# Patient Record
Sex: FEMALE | Race: WHITE
[De-identification: names, ages, dates, MRNs, and addresses within clinical notes are randomized per-mention and may not be internally consistent; named-entity substitution may affect disease eponyms.]

---

## 2017-07-11 ENCOUNTER — HOSPITAL ENCOUNTER (EMERGENCY)
Dept: HOSPITAL 26 - MED | Age: 57
Discharge: HOME | End: 2017-07-11
Payer: COMMERCIAL

## 2017-07-11 VITALS — SYSTOLIC BLOOD PRESSURE: 107 MMHG | DIASTOLIC BLOOD PRESSURE: 69 MMHG

## 2017-07-11 VITALS — DIASTOLIC BLOOD PRESSURE: 83 MMHG | SYSTOLIC BLOOD PRESSURE: 147 MMHG

## 2017-07-11 VITALS — HEIGHT: 65 IN | BODY MASS INDEX: 18.33 KG/M2 | WEIGHT: 110 LBS
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
## 2017-07-11 DIAGNOSIS — K21.9: ICD-10-CM

## 2017-07-11 DIAGNOSIS — Z90.89: ICD-10-CM
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
## 2017-07-11 DIAGNOSIS — F20.9: ICD-10-CM

## 2017-07-11 DIAGNOSIS — Z88.8: ICD-10-CM
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
## 2017-07-11 DIAGNOSIS — S01.81XA: Primary | ICD-10-CM

## 2017-07-11 NOTE — NUR
rechecked restraint;checked circulation;no skin discoloration/skin tear 
noted;safety measures done;all monitors in placed;will continue to monitor pt.

## 2017-07-11 NOTE — NUR
checked ule and lle;no compromise circulation noted;safety measures done;all 
monitors in placed;will continue to monitor pt,

## 2017-07-11 NOTE — NUR
PATIENT PRESENTS TO ED WITH brought in by ems;[facility] where pt 
resides, called 911;pt found with lac over left eyebrow, per pt slipped and 
fell in the bathroom;PT IN RESTRAINT;combative , ams;hx OF schizophrenia;AWAKE 
AND ALERT;LUNGS CLEAR BL; HR EVEN AND REGULAR; PT DENIES ANY FEVER, CP, SOB, OR 
COUGH AT THIS TIME; PATIENT STATES PAIN OF 0/10 AT THIS TIME; PATIENT 
POSITIONED FOR COMFORT; HOB ELEVATED; BEDRAILS UP X2; BED DOWN. ALL MONITORS IN 
PLACED;

## 2017-07-11 NOTE — NUR
Patient discharged with v/s stable. Written and verbal after care instructions 
given and explained. Patient alert, oriented and verbalized understanding of 
instructions. Ambulatory with steady gait. All questions addressed prior to 
discharge. ID band removed. Patient advised to follow up with PMD. Rx of NOTRIN 
given. Patient educated on indication of medication including possible reaction 
and side effects. Opportunity to ask questions provided and answered.

## 2017-07-11 NOTE — NUR
pt is restless/agiatted;keep on screaming;tried to calm pt;talked to pt;but pt 
still agitated;safety measures done;all monitors in placed;willcontinue to 
monitor pt/

## 2017-07-11 NOTE — NUR
TALKED TO PT;RESTRAINT WAS REMOVED;NO SKIN TEAR ;NO CIRCULATION COMPROMSED 
NOTED;SAFETY MEASUSRES DONE,

## 2018-12-07 ENCOUNTER — HOSPITAL ENCOUNTER (EMERGENCY)
Dept: HOSPITAL 26 - MED | Age: 58
Discharge: HOME | End: 2018-12-07
Payer: COMMERCIAL

## 2018-12-07 VITALS — SYSTOLIC BLOOD PRESSURE: 112 MMHG | DIASTOLIC BLOOD PRESSURE: 69 MMHG

## 2018-12-07 VITALS — WEIGHT: 102.25 LBS | HEIGHT: 64 IN | BODY MASS INDEX: 17.46 KG/M2

## 2018-12-07 VITALS — DIASTOLIC BLOOD PRESSURE: 69 MMHG | SYSTOLIC BLOOD PRESSURE: 112 MMHG

## 2018-12-07 DIAGNOSIS — L84: Primary | ICD-10-CM

## 2018-12-07 DIAGNOSIS — Z79.899: ICD-10-CM

## 2018-12-07 DIAGNOSIS — F20.9: ICD-10-CM

## 2018-12-07 DIAGNOSIS — Z88.8: ICD-10-CM

## 2018-12-07 PROCEDURE — 73630 X-RAY EXAM OF FOOT: CPT

## 2018-12-07 PROCEDURE — 99283 EMERGENCY DEPT VISIT LOW MDM: CPT

## 2018-12-07 PROCEDURE — 96372 THER/PROPH/DIAG INJ SC/IM: CPT

## 2018-12-07 NOTE — NUR
Patient discharged with v/s stable. Written and verbal after care instructions 
given and explained. 

Patient alert, oriented and verbalized understanding of instructions. 
Ambulatory with steady gait. All questions addressed prior to discharge. ID 
band removed. Patient advised to follow up with PMD. Rx of CLINDAMYCIN AND 
VOLTAREN given. Patient educated on indication of medication including possible 
reaction and side effects. Opportunity to ask questions provided and answered.

## 2018-12-07 NOTE — NUR
RT FOOT PAIN, AND  SWELLING. SHE STEPPED ON A GLASS FOR 2 WEEKS, TETANUS VACC 
UTD. PT EAGER TO BE SEEN BY A DR. WE ADVISED THAT PT ARE SEEN BASED ON SEVERITY 
AND SHE WILL BE SEEN AS SOON AS POSSIBLE. VSS; PATIENT POSITIONED FOR COMFORT; 
HOB ELEVATED; BEDRAILS UP X1; BED DOWN. ER MD MADE AWARE OF PT STATUS.